# Patient Record
Sex: FEMALE | Race: WHITE
[De-identification: names, ages, dates, MRNs, and addresses within clinical notes are randomized per-mention and may not be internally consistent; named-entity substitution may affect disease eponyms.]

---

## 2018-07-20 ENCOUNTER — HOSPITAL ENCOUNTER (INPATIENT)
Dept: HOSPITAL 47 - EC | Age: 73
LOS: 4 days | Discharge: HOME | DRG: 247 | End: 2018-07-24
Payer: MEDICARE

## 2018-07-20 VITALS — BODY MASS INDEX: 39.9 KG/M2

## 2018-07-20 DIAGNOSIS — Z82.49: ICD-10-CM

## 2018-07-20 DIAGNOSIS — Z88.2: ICD-10-CM

## 2018-07-20 DIAGNOSIS — I21.4: Primary | ICD-10-CM

## 2018-07-20 DIAGNOSIS — E66.9: ICD-10-CM

## 2018-07-20 DIAGNOSIS — I08.1: ICD-10-CM

## 2018-07-20 DIAGNOSIS — E78.5: ICD-10-CM

## 2018-07-20 DIAGNOSIS — Z87.891: ICD-10-CM

## 2018-07-20 DIAGNOSIS — I25.110: ICD-10-CM

## 2018-07-20 DIAGNOSIS — M06.9: ICD-10-CM

## 2018-07-20 DIAGNOSIS — E11.9: ICD-10-CM

## 2018-07-20 DIAGNOSIS — Z79.82: ICD-10-CM

## 2018-07-20 DIAGNOSIS — Z83.3: ICD-10-CM

## 2018-07-20 DIAGNOSIS — Z79.84: ICD-10-CM

## 2018-07-20 DIAGNOSIS — I95.9: ICD-10-CM

## 2018-07-20 DIAGNOSIS — Z96.659: ICD-10-CM

## 2018-07-20 DIAGNOSIS — Z90.710: ICD-10-CM

## 2018-07-20 DIAGNOSIS — Z79.899: ICD-10-CM

## 2018-07-20 DIAGNOSIS — I10: ICD-10-CM

## 2018-07-20 DIAGNOSIS — I45.10: ICD-10-CM

## 2018-07-20 LAB
CK SERPL-CCNC: 66 U/L (ref 30–135)
CK SERPL-CCNC: 74 U/L (ref 30–135)
GLUCOSE BLD-MCNC: 152 MG/DL (ref 75–99)
TROPONIN I SERPL-MCNC: 0.03 NG/ML (ref 0–0.03)
TROPONIN I SERPL-MCNC: 0.04 NG/ML (ref 0–0.03)

## 2018-07-20 PROCEDURE — 96376 TX/PRO/DX INJ SAME DRUG ADON: CPT

## 2018-07-20 PROCEDURE — 93005 ELECTROCARDIOGRAM TRACING: CPT

## 2018-07-20 PROCEDURE — 80061 LIPID PANEL: CPT

## 2018-07-20 PROCEDURE — 85730 THROMBOPLASTIN TIME PARTIAL: CPT

## 2018-07-20 PROCEDURE — 36415 COLL VENOUS BLD VENIPUNCTURE: CPT

## 2018-07-20 PROCEDURE — 93306 TTE W/DOPPLER COMPLETE: CPT

## 2018-07-20 PROCEDURE — 85049 AUTOMATED PLATELET COUNT: CPT

## 2018-07-20 PROCEDURE — 82553 CREATINE MB FRACTION: CPT

## 2018-07-20 PROCEDURE — 99285 EMERGENCY DEPT VISIT HI MDM: CPT

## 2018-07-20 PROCEDURE — 85025 COMPLETE CBC W/AUTO DIFF WBC: CPT

## 2018-07-20 PROCEDURE — 80048 BASIC METABOLIC PNL TOTAL CA: CPT

## 2018-07-20 PROCEDURE — 83735 ASSAY OF MAGNESIUM: CPT

## 2018-07-20 PROCEDURE — 82550 ASSAY OF CK (CPK): CPT

## 2018-07-20 PROCEDURE — 96365 THER/PROPH/DIAG IV INF INIT: CPT

## 2018-07-20 PROCEDURE — 84484 ASSAY OF TROPONIN QUANT: CPT

## 2018-07-20 PROCEDURE — 93458 L HRT ARTERY/VENTRICLE ANGIO: CPT

## 2018-07-20 PROCEDURE — 96366 THER/PROPH/DIAG IV INF ADDON: CPT

## 2018-07-20 RX ADMIN — HEPARIN SODIUM PRN UNIT: 5000 INJECTION, SOLUTION INTRAVENOUS; SUBCUTANEOUS at 23:20

## 2018-07-20 RX ADMIN — HEPARIN SODIUM SCH MLS/HR: 5000 INJECTION, SOLUTION INTRAVENOUS at 17:12

## 2018-07-20 RX ADMIN — NITROGLYCERIN SCH: 20 OINTMENT TOPICAL at 23:15

## 2018-07-20 RX ADMIN — NITROGLYCERIN SCH INCH: 20 OINTMENT TOPICAL at 19:30

## 2018-07-20 RX ADMIN — SODIUM CHLORIDE, PRESERVATIVE FREE SCH: 5 INJECTION INTRAVENOUS at 22:01

## 2018-07-20 NOTE — ED
General Adult HPI





- General


Chief complaint: Chest Pain


Stated complaint: Chest pain


Time Seen by Provider: 07/20/18 15:54


Source: patient, EMS, RN notes reviewed, old records reviewed (Reviewed records 

from [facility] including x-ray report, ER report, EKGs and 

laboratory data.)


Mode of arrival: EMS


Limitations: no limitations





- History of Present Illness


Initial comments: 





Patient is a pleasant 73-year-old female presenting to the emergency Department 

with chest discomfort.  Patient has been having symptoms for close to a month.  

Symptoms are exertional.  Symptoms improved with rest and are mild at this 

time.  Discomfort was severe today while walking to [facility] for 

a mammogram.  Patient has minimal associated dyspnea.  No nausea.  Patient does 

have associated sweating.  No history of similar symptoms previously.  Case was 

discussed with transferring physician prior to transfer.  There is questionable 

EKG changes in lead aVF and 3.  Troponin was negative.  Patient denies any calf 

pain or leg swelling.





Review of Systems


ROS Statement: 


Those systems with pertinent positive or pertinent negative responses have been 

documented in the HPI.





ROS Other: All systems not noted in ROS Statement are negative.


Constitutional: Denies: fever


Eyes: Denies: eye pain


ENT: Denies: ear pain


Respiratory: Denies: cough


Cardiovascular: Reports: chest pain


Endocrine: Denies: fatigue


Gastrointestinal: Denies: abdominal pain


Genitourinary: Denies: dysuria


Musculoskeletal: Denies: back pain


Skin: Denies: rash


Neurological: Denies: weakness





Past Medical History


Past Medical History: Diabetes Mellitus, Hyperlipidemia, Hypertension, 

Rheumatoid Arthritis (RA)


Past Surgical History: Hysterectomy


Additional Past Surgical History / Comment(s): knee replacement


Past Psychological History: No Psychological Hx Reported


Smoking Status: Never smoker


Past Alcohol Use History: Rare


Past Drug Use History: None Reported





General Exam


Limitations: no limitations


General appearance: alert, in no apparent distress


Head exam: Present: atraumatic, normocephalic


Eye exam: Present: normal appearance, PERRL


ENT exam: Present: normal oropharynx


Neck exam: Present: normal inspection


Respiratory exam: Present: normal lung sounds bilaterally.  Absent: chest wall 

tenderness


Cardiovascular Exam: Present: regular rate, normal rhythm


  ** Expanded


Peripheral pulses: 2+: Radial (R), Radial (L), Posterior Tibialis (R), 

Posterior Tibialis (L)


GI/Abdominal exam: Present: soft.  Absent: tenderness


Extremities exam: Present: normal inspection.  Absent: pedal edema, calf 

tenderness


Neurological exam: Present: alert


Psychiatric exam: Present: normal affect, normal mood


Skin exam: Present: normal color





Course





 Vital Signs











  07/20/18





  15:58


 


Temperature 97.8 F


 


Pulse Rate 73


 


Respiratory 16





Rate 


 


Blood Pressure 162/74


 


O2 Sat by Pulse 97





Oximetry 














- Reevaluation(s)


Reevaluation #1: 





07/20/18 16:13


Case was discussed with Dr. pat, who will admit for Dr. Lehman.





EKG Findings





- EKG Comments:


EKG Findings:: Normal sinus rhythm 78.  .  .  .  .  

Left axis.  Incomplete right bundle-branch block.  LVH criteria.  No acute ST 

change.





Disposition


Clinical Impression: 


 Unstable angina pectoris





Disposition: ADMITTED AS IP TO THIS HOSP


Is patient prescribed a controlled substance at d/c from ED?: No


Referrals: 


Elidia Lehman MD [Primary Care Provider] - 1-2 days


Decision Time: 16:14

## 2018-07-21 LAB
CHOLEST SERPL-MCNC: 170 MG/DL (ref ?–200)
GLUCOSE BLD-MCNC: 100 MG/DL (ref 75–99)
GLUCOSE BLD-MCNC: 114 MG/DL (ref 75–99)
GLUCOSE BLD-MCNC: 118 MG/DL (ref 75–99)
GLUCOSE BLD-MCNC: 96 MG/DL (ref 75–99)
HDLC SERPL-MCNC: 79 MG/DL (ref 40–60)
LDLC SERPL CALC-MCNC: 75 MG/DL (ref 0–99)
PLATELET # BLD AUTO: 197 K/UL (ref 150–450)
TRIGL SERPL-MCNC: 78 MG/DL (ref ?–150)

## 2018-07-21 RX ADMIN — NITROGLYCERIN SCH: 20 OINTMENT TOPICAL at 05:13

## 2018-07-21 RX ADMIN — INSULIN ASPART SCH: 100 INJECTION, SOLUTION INTRAVENOUS; SUBCUTANEOUS at 21:01

## 2018-07-21 RX ADMIN — ASPIRIN 325 MG ORAL TABLET SCH MG: 325 PILL ORAL at 09:07

## 2018-07-21 RX ADMIN — METOPROLOL TARTRATE SCH MG: 25 TABLET, FILM COATED ORAL at 21:02

## 2018-07-21 RX ADMIN — SODIUM CHLORIDE, PRESERVATIVE FREE SCH ML: 5 INJECTION INTRAVENOUS at 21:02

## 2018-07-21 RX ADMIN — ASPIRIN 325 MG ORAL TABLET SCH: 325 PILL ORAL at 10:47

## 2018-07-21 RX ADMIN — NITROGLYCERIN SCH INCH: 20 OINTMENT TOPICAL at 17:13

## 2018-07-21 RX ADMIN — ATORVASTATIN CALCIUM SCH: 80 TABLET, FILM COATED ORAL at 10:48

## 2018-07-21 RX ADMIN — NITROGLYCERIN SCH INCH: 20 OINTMENT TOPICAL at 12:02

## 2018-07-21 RX ADMIN — HEPARIN SODIUM SCH MLS/HR: 5000 INJECTION, SOLUTION INTRAVENOUS at 14:39

## 2018-07-21 RX ADMIN — NITROGLYCERIN SCH INCH: 20 OINTMENT TOPICAL at 22:47

## 2018-07-21 RX ADMIN — SODIUM CHLORIDE, PRESERVATIVE FREE SCH ML: 5 INJECTION INTRAVENOUS at 09:08

## 2018-07-21 RX ADMIN — METOPROLOL TARTRATE SCH MG: 25 TABLET, FILM COATED ORAL at 11:58

## 2018-07-21 RX ADMIN — ATORVASTATIN CALCIUM SCH MG: 80 TABLET, FILM COATED ORAL at 09:26

## 2018-07-21 RX ADMIN — INSULIN ASPART SCH: 100 INJECTION, SOLUTION INTRAVENOUS; SUBCUTANEOUS at 17:01

## 2018-07-21 NOTE — P.HPIM
History of Present Illness


73-year-old the female came in with compensative chest chart chest pain which 

as of breath and diaphoresis denied any pleurisy not associated with food 

patient has minimally elevated troponins of 0.03 cardiology evaluated the 

patient patient has nonspecific ST-T wave changes they believe patient has non-

ST elevation microinfarction the recommending cardiac catheterization on 

Monday.  Patient is presently on IV heparin patient has sinus last and chest 

pain nonexertional.








Review of Systems


REVIEW OF SYSTEMS: 


CONSTITUTIONAL: No fever, no malaise, no fatigue. 


HEENT: No recent visual problems or hearing problems. Denied any sore throat. 


CARDIOVASCULAR: No  orthopnea, PND, no palpitations, no syncope. 


PULMONARY: No shortness of breath, no cough, no hemoptysis. 


GASTROINTESTINAL: No diarrhea, no nausea, no vomiting, no abdominal pain. 

Normoactive bowel sounds. 


NEUROLOGICAL: No headaches, no weakness, no numbness. 


HEMATOLOGICAL: Denies any bleeding or petechiae. 


GENITOURINARY: Denies any burning micturition, frequency, or urgency. 


MUSCULOSKELETAL/RHEUMATOLOGICAL: Denies any joint pain, swelling, or any muscle 

pain. 


ENDOCRINE: Denies any polyuria or polydipsia. 





The rest of the 14-point review of systems is negative.











Past Medical History


Past Medical History: Diabetes Mellitus, Hyperlipidemia, Hypertension, 

Rheumatoid Arthritis (RA)


History of Any Multi-Drug Resistant Organisms: None Reported


Past Surgical History: Hysterectomy


Additional Past Surgical History / Comment(s): knee replacement


Past Anesthesia/Blood Transfusion Reactions: No Reported Reaction


Smoking Status: Former smoker





- Past Family History


  ** Mother


Family Medical History: Diabetes Mellitus





  ** Brother(s)


Family Medical History: Diabetes Mellitus


Additional Family Medical History / Comment(s): atrial tachycardia





  ** Sister(s)


Additional Family Medical History / Comment(s): ventricular tachycardia





Medications and Allergies


 Home Medications











 Medication  Instructions  Recorded  Confirmed  Type


 


Aspirin EC [Ecotrin Low Dose] 81 mg PO HS 07/20/18 07/20/18 History


 


Cholecalciferol [Vitamin D3] 1,000 unit PO DAILY 07/20/18 07/20/18 History


 


Etanercept [Enbrel] 50 mg SQ KC 07/20/18 07/20/18 History


 


Leflunomide [Arava] 20 mg PO DAILY 07/20/18 07/20/18 History


 


Lisinopril [Zestril] 10 mg PO DAILY 07/20/18 07/20/18 History


 


Simvastatin [Zocor] 20 mg PO HS 07/20/18 07/20/18 History


 


metFORMIN HCL 1,000 mg PO BID 07/20/18 07/20/18 History











 Allergies











Allergy/AdvReac Type Severity Reaction Status Date / Time


 


Sulfa (Sulfonamide Allergy  Rash/Hives Verified 07/20/18 16:26





Antibiotics)     














Physical Exam


Vitals: 


 Vital Signs











  Temp Pulse Pulse Resp BP BP Pulse Ox


 


 07/21/18 11:55  97.6 F   79  16   127/69  96


 


 07/21/18 09:50        96


 


 07/21/18 09:00    82  16   


 


 07/21/18 04:00  97.8 F   82  16   104/57  95


 


 07/21/18 00:00  98.5 F   83  17   89/52  96


 


 07/20/18 19:27   88   18  139/71   96


 


 07/20/18 16:58  98.1 F  82   18  149/73   93 L


 


 07/20/18 15:58  97.8 F  73   16  162/74   97








 Intake and Output











 07/20/18 07/21/18 07/21/18





 22:59 06:59 14:59


 


Intake Total  582.631 601.268


 


Balance  582.631 601.268


 


Intake:   


 


  IV  460 100


 


    0.9  200 100


 


    Heparin Sod,Pork in 0.45%  260 





    NaCl 25,000 unit In 0.45   





    % NaCl 1 500ml.bag @ 8.9   





    UNITS/KG/HR 19.94 mls/hr   





    IV .Q24H CHARLY Rx#:   





    [id number]   


 


  Intake, IV Titration  122.631 261.268





  Amount   


 


    Heparin Sod,Pork in 0.45%  122.631 261.268





    NaCl 25,000 unit In 0.45   





    % NaCl 1 500ml.bag @ 8.9   





    UNITS/KG/HR 19.94 mls/hr   





    IV .Q24H CHARLY Rx#:   





    [id number]   


 


  Oral   240


 


Other:   


 


  Voiding Method  Toilet Toilet


 


  # Voids 1 1 1


 


  Weight 112.037 kg 111.9 kg 











PHYSICAL EXAMINATION: 





GENERAL: The patient is alert and oriented x3, not in any acute distress. Well 

developed, well nourished. 


HEENT: Pupils are round and equally reacting to light. EOMI. No scleral 

icterus. No conjunctival pallor. Normocephalic, atraumatic. No pharyngeal 

erythema. No thyromegaly. 


CARDIOVASCULAR: S1 and S2 present. No murmurs, rubs, or gallops. 


PULMONARY: Chest is clear to auscultation, no wheezing or crackles. 


ABDOMEN: Soft, nontender, nondistended, normoactive bowel sounds. No palpable 

organomegaly. 


MUSCULOSKELETAL: No joint swelling or deformity.


EXTREMITIES: No cyanosis, clubbing, or pedal edema. 


NEUROLOGICAL: Gross neurological examination did not reveal any focal deficits. 


SKIN: No rashes. 











Results


CBC & Chem 7: 


 07/21/18 06:27





Labs: 


 Abnormal Lab Results - Last 24 Hours (Table)











  07/20/18 07/20/18 07/20/18 Range/Units





  16:09 16:09 20:57 


 


APTT  18.9 L    (22.0-30.0)  sec


 


POC Glucose (mg/dL)    152 H  (75-99)  mg/dL


 


Troponin I   0.035 H*   (0.000-0.034)  ng/mL


 


HDL Cholesterol     (40-60)  mg/dL














  07/20/18 07/21/18 07/21/18 Range/Units





  22:20 06:06 06:27 


 


APTT  30.4 H   52.2 H  (22.0-30.0)  sec


 


POC Glucose (mg/dL)   118 H   (75-99)  mg/dL


 


Troponin I     (0.000-0.034)  ng/mL


 


HDL Cholesterol     (40-60)  mg/dL














  07/21/18 Range/Units





  06:27 


 


APTT   (22.0-30.0)  sec


 


POC Glucose (mg/dL)   (75-99)  mg/dL


 


Troponin I   (0.000-0.034)  ng/mL


 


HDL Cholesterol  79 H  (40-60)  mg/dL














Thrombosis Risk Factor Assmnt





- Choose All That Apply


Any of the Below Risk Factors Present?: Yes


Each Factor Represents 1 point: Obesity (BMI >25)


Other Risk Factors: Yes


Each Risk Factor Represents 2 Points: Age 61-74 years


Thrombosis Risk Factor Assessment Total Risk Factor Score: 3


Thrombosis Risk Factor Assessment Level: Moderate Risk





Assessment and Plan


Plan: 


-Chest discomfort possible non-ST elevation microinfarction, patient is on beta 

blocker patient is on aspirin IV heparin.  Patient will undergo cardiac 

catheterization on Monday


-Hypertension hold off on lisinopril with concerns of hypotension patient blood 

pressure is on the low normal side can continue with metoprolol


-Hyperlipidemia


-Type 2 diabetes mellitus for left metformin patient may sliding scale insulin


-Rheumatoid arthritis patient is on biologic agents which will be continued


-Wasting

## 2018-07-21 NOTE — P.PN
Progress Note - Text


This is an addendum to the dictated cardiology consultation.  The patient 

presents with symptoms of exertional chest discomfort, started about a months 

ago.  She is pain-free at this time and has no rest symptoms.  She has no prior 

documented history of CAD or any cardiac workup.  She had minimal elevation of 

the troponin on her initial sample, there was no acute EKG changes.


Her physical examination shows no evidence of fluid overload and she is in 

sinus mechanism.


The patient presents with new onset unstable angina with minimal elevation of 

the troponin.  I will obtain an echocardiogram with Doppler, add beta blocker 

and statin, proceed with cardiac catheterization to further assess her status 

and guide her treatment.  The risk and the complications and indications were 

discussed with the patient and her .  Thank you for this consult we will 

follow with you.

## 2018-07-21 NOTE — P.CRDCN
History of Present Illness


Consult date: 07/21/18


Requesting physician: Jm Yanez


Consult reason: chest pain


Chief complaint: Chest pain


History of present illness: 


This is a pleasant 73-year-old  female with history of hypertension, 

diabetes, hyperlipidemia, nonsmoker, who presents to the hospital with symptoms 

of chest discomfort.  According to the patient she states she's been getting 

symptoms off and on for approximately one month.  She describes as symptoms as 

a pressure and heaviness in her chest with associated sharp discomfort as well, 

she does get associated shortness of breath with diaphoresis.  These usually 

occur with exertion and subside with rest.  Yesterday the patient was scheduled 

to have her routine mammogram [facility], just walking into the 

hospital she developed moderate to severe chest discomfort.  Patient was seen 

in the emergency room at [facility], and subsequently transferred 

here.  Laboratory data at [facility], white blood cell count 4.9, 

hemoglobin 12.2, platelet count 203.  Sodium 140, potassium 4.0, BUN 26, 

creatinine 0.7.  Magnesium 1.4 alk phos AST ALT normal troponin 0.03.  Chest x-

ray performed there revealed stable findings without evidence of acute 

infiltrate or vascular compensation.  EKG performed at [facility] 

showed a normal sinus rhythm with nonspecific ST-T wave changes noted in the 

inferior leads, incomplete right bundle branch block pattern.  EKG performed on 

arrival here showed a normal sinus rhythm with ST-T wave changes in the 

inferior leads and incomplete right bundle branch block pattern.  Laboratory 

reviewed here, Troponins 0.035 0.030.  Cholesterol 170, triglycerides 78, LDL 75

, HDL 79.  Blood pressure 104/60 with a heart rate in the 80s, afebrile.  At 

the time of my examination this morning, patient is currently chest pain-free.








Past Medical History


Past Medical History: Diabetes Mellitus, Hyperlipidemia, Hypertension, 

Rheumatoid Arthritis (RA)


History of Any Multi-Drug Resistant Organisms: None Reported


Past Surgical History: Hysterectomy


Additional Past Surgical History / Comment(s): knee replacement


Past Anesthesia/Blood Transfusion Reactions: No Reported Reaction


Smoking Status: Former smoker





- Past Family History


  ** Mother


Family Medical History: Diabetes Mellitus





  ** Brother(s)


Family Medical History: Diabetes Mellitus


Additional Family Medical History / Comment(s): atrial tachycardia





  ** Sister(s)


Additional Family Medical History / Comment(s): ventricular tachycardia





Medications and Allergies


 Home Medications











 Medication  Instructions  Recorded  Confirmed  Type


 


Aspirin EC [Ecotrin Low Dose] 81 mg PO HS 07/20/18 07/20/18 History


 


Cholecalciferol [Vitamin D3] 1,000 unit PO DAILY 07/20/18 07/20/18 History


 


Etanercept [Enbrel] 50 mg SQ KC 07/20/18 07/20/18 History


 


Leflunomide [Arava] 20 mg PO DAILY 07/20/18 07/20/18 History


 


Lisinopril [Zestril] 10 mg PO DAILY 07/20/18 07/20/18 History


 


Simvastatin [Zocor] 20 mg PO HS 07/20/18 07/20/18 History


 


metFORMIN HCL 1,000 mg PO BID 07/20/18 07/20/18 History











 Allergies











Allergy/AdvReac Type Severity Reaction Status Date / Time


 


Sulfa (Sulfonamide Allergy  Rash/Hives Verified 07/20/18 16:26





Antibiotics)     














Physical Exam


Vitals: 


 Vital Signs











  Temp Pulse Pulse Resp BP BP Pulse Ox


 


 07/21/18 04:00  97.8 F   82  16   104/57  95


 


 07/21/18 00:00  98.5 F   83  17   89/52  96


 


 07/20/18 19:27   88   18  139/71   96


 


 07/20/18 16:58  98.1 F  82   18  149/73   93 L


 


 07/20/18 15:58  97.8 F  73   16  162/74   97








 Intake and Output











 07/20/18 07/21/18 07/21/18





 22:59 06:59 14:59


 


Intake Total  582.631 0


 


Balance  582.631 0


 


Intake:   


 


  IV  460 


 


    0.9  200 


 


    Heparin Sod,Pork in 0.45%  260 





    NaCl 25,000 unit In 0.45   





    % NaCl 1 500ml.bag @ 8.9   





    UNITS/KG/HR 19.94 mls/hr   





    IV .Q24H CHARLY Rx#:   





    [id number]   


 


  Intake, IV Titration  122.631 





  Amount   


 


    Heparin Sod,Pork in 0.45%  122.631 





    NaCl 25,000 unit In 0.45   





    % NaCl 1 500ml.bag @ 8.9   





    UNITS/KG/HR 19.94 mls/hr   





    IV .Q24H CHARLY Rx#:   





    [id number]   


 


  Oral   0


 


Other:   


 


  Voiding Method  Toilet 


 


  # Voids 1 1 


 


  Weight 112.037 kg 111.9 kg 














PHYSICAL EXAMINATION: 





GENERAL: Pleasant 73-year-old  female in no acute distress at the time 

of my examination





HEENT: Head is atraumatic, normocephalic.  Pupils equal, round.  Sclera 

anicteric. Conjunctiva are clear.  Mucous membranes of the mouth are moist.  

Neck is supple.  There is no elevated jugular venous pressure.  No carotid  

bruit is heard.





HEART EXAMINATION: Heart S1, S2 normal.  No murmur or gallop heard.





CHEST EXAMINATION: Lungs are clear to auscultation and precussion. No chest 

wall tenderness is noted on palpation or with deep breathing.





ABDOMEN:  Soft, obese, nontender. Bowel sounds are heard. No organomegaly noted.


 


EXTREMITIES: 2+ peripheral pulses with trace  evidence of peripheral edema and 

no calf tenderness noted.





NEUROLOGIC patient is awake, alert and oriented X3.


 


.


 








Results





 07/21/18 06:27





 Cardiac Enzymes











  07/20/18 07/20/18 Range/Units





  16:09 22:20 


 


CK-MB (CK-2)  1.9  2.2  (0.0-2.4)  ng/mL


 


Troponin I  0.035 H*  0.030  (0.000-0.034)  ng/mL








 Coagulation











  07/20/18 07/20/18 07/21/18 Range/Units





  16:09 22:20 06:27 


 


APTT  18.9 L  30.4 H  52.2 H  (22.0-30.0)  sec








 Lipids











  07/21/18 Range/Units





  06:27 


 


Triglycerides  78  (<150)  mg/dL


 


Cholesterol  170  (<200)  mg/dL


 


HDL Cholesterol  79 H  (40-60)  mg/dL








 CBC











  07/21/18 Range/Units





  06:27 


 


Plt Count  197  (150-450)  k/uL








 Current Medications











Generic Name Dose Route Start Last Admin





  Trade Name Freq  PRN Reason Stop Dose Admin


 


Aspirin  325 mg  07/21/18 09:00  





  Aspirin  PO   





  DAILY CHARLY   





     





     





     





     


 


Heparin Sodium (Porcine)  0 unit  07/20/18 16:15  07/20/18 23:20





  Heparin  IV   4,000 unit





  Q6HR PRN   Administration





  Low PTT   





     





  Protocol   





     


 


Heparin Sodium/Sodium Chloride  500 mls @ 19.94 mls/hr  07/20/18 16:15  07/20/ 18 23:21





  25,000 unit/ Sodium Chloride  IV   11.9 units/kg/hr





  .Q24H CHARLY   26.66 mls/hr





     Titration





     





  Protocol   





  8.9 UNITS/KG/HR   


 


Nitroglycerin  1 inch  07/20/18 18:00  07/21/18 05:13





  Nitro-Bid Oint  TOPICAL   Not Given





  Q6HR [facility]   





     





     





     





     


 


Nitroglycerin  0.4 mg  07/20/18 16:15  





  Nitrostat  SUBLINGUAL   





  Q5M PRN   





  Chest Pain   





     





     





     


 


Sodium Chloride  10 ml  07/20/18 21:00  07/20/18 22:01





  Saline Flush  IV   Not Given





  BID CHARLY   





     





     





     





     








 Intake and Output











 07/20/18 07/21/18 07/21/18





 22:59 06:59 14:59


 


Intake Total  582.631 0


 


Balance  582.631 0


 


Intake:   


 


  IV  460 


 


    0.9  200 


 


    Heparin Sod,Pork in 0.45%  260 





    NaCl 25,000 unit In 0.45   





    % NaCl 1 500ml.bag @ 8.9   





    UNITS/KG/HR 19.94 mls/hr   





    IV .Q24H CHARLY Rx#:   





    [id number]   


 


  Intake, IV Titration  122.631 





  Amount   


 


    Heparin Sod,Pork in 0.45%  122.631 





    NaCl 25,000 unit In 0.45   





    % NaCl 1 500ml.bag @ 8.9   





    UNITS/KG/HR 19.94 mls/hr   





    IV .Q24H CHARLY Rx#:   





    [id number]   


 


  Oral   0


 


Other:   


 


  Voiding Method  Toilet 


 


  # Voids 1 1 


 


  Weight 112.037 kg 111.9 kg 








 





 07/21/18 06:27 











EKG Interpretations (text)


EKG shows normal sinus rhythm with incomplete right bundle branch block pattern 

and nonspecific ST-T wave changes noted in the inferior leads








Assessment and Plan


Plan: 


Assessment and plan


#1 chest discomfort suggestive of possible acute coronary syndrome.  EKG shows 

normal sinus rhythm with incomplete right bundle branch block pattern and 

nonspecific ST-T wave changes in the inferior leads.  Troponins 0.03, 0.03, 

0.03.


#2 hypertension


#3 hyperlipidemia


#4 diabetes


#5 obesity


#6 rheumatoid arthritis








Plan


We will obtain a subsequent troponin, echocardiogram with Doppler study is also 

been requested.  We will continue with an aspirin, IV heparin, Nitropaste, we 

will also initiate the patient on a statin.  Pending the results of subsequent 

troponin and echo.  Further recommendations will be made.





DNP note has been reviewed, I agree with a documented findings and plan of 

care.  Patient was seen and examined.

## 2018-07-21 NOTE — ECHOF
Referral Reason:chest pain



MEASUREMENTS

--------

HEIGHT: 165.1 cm

WEIGHT: 111.6 kg

BP: 

RVIDd:   2.8 cm     (< 3.3)

IVSd:   1.2 cm     (0.6 - 1.1)

LVIDd:   4.2 cm     (3.9 - 5.3)

LVPWd:   1.4 cm     (0.6 - 1.1)

IVSs:   1.5 cm

LVIDs:   3.4 cm

LVPWs:   1.4 cm

LAESV Index (A-L):   30.27 ml/m

Ao Diam:   3.7 cm     (2.0 - 3.7)

AV Cusp:   2.0 cm     (1.5 - 2.6)

LA Diam:   3.0 cm     (2.7 - 3.8)

MV EXCURSION:   16.009 mm     (> 18.000)

MV EF SLOPE:   68 mm/s     (70 - 150)

EPSS:   0.8 cm

MV E Paulino:   0.65 m/s

MV DecT:   295 ms

MV A Paulino:   0.99 m/s

MV E/A Ratio:   0.66 

RAP:   5.00 mmHg

RVSP:   30.62 mmHg







FINDINGS

--------

Sinus rhythm.

This was a technically adequate study.

The left ventricular size is normal.   There is mild concentric left ventricular hypertrophy.   Overa
ll left ventricular systolic function is normal with, an EF between 55 - 60 %.

The right ventricle is normal in size.

LA is midly dilated 29-33ml/m2.

The right atrial size is normal.

The aortic valve is trileaflet, and appears structurally normal. No aortic stenosis or regurgitation.


Moderate mitral annular calcification present.   Mild mitral regurgitation is present.

Mild tricuspid regurgitation present.   There is no evidence of pulmonary hypertension.   The right v
entricular systolic pressure, as measured by Doppler, is 30.62mmHg.

The pulmonic valve was not well visualized.   There is no pulmonic regurgitation present.

The aortic root size is normal.

There is no pericardial effusion.



CONCLUSIONS

--------

1. Sinus rhythm.

2. The left ventricular size is normal.

3. There is mild concentric left ventricular hypertrophy.

4. Overall left ventricular systolic function is normal with, an EF between 55 - 60 %.

5. LA is midly dilated 29-33ml/m2.

6. The aortic valve is trileaflet, and appears structurally normal. No aortic stenosis or regurgitati
on.

7. Moderate mitral annular calcification present.

8. Mild mitral regurgitation is present.

9. Mild tricuspid regurgitation present.

10. There is no evidence of pulmonary hypertension.

11. The pulmonic valve was not well visualized.

12. There is no pulmonic regurgitation present.

13. The aortic root size is normal.

14. There is no pericardial effusion.





SONOGRAPHER: Malka Wilder RDCS

## 2018-07-22 LAB
ANION GAP SERPL CALC-SCNC: 5 MMOL/L
BUN SERPL-SCNC: 17 MG/DL (ref 7–17)
CALCIUM SPEC-MCNC: 9.3 MG/DL (ref 8.4–10.2)
CHLORIDE SERPL-SCNC: 115 MMOL/L (ref 98–107)
CO2 SERPL-SCNC: 21 MMOL/L (ref 22–30)
GLUCOSE BLD-MCNC: 101 MG/DL (ref 75–99)
GLUCOSE BLD-MCNC: 101 MG/DL (ref 75–99)
GLUCOSE BLD-MCNC: 95 MG/DL (ref 75–99)
GLUCOSE BLD-MCNC: 95 MG/DL (ref 75–99)
GLUCOSE SERPL-MCNC: 102 MG/DL (ref 74–99)
PLATELET # BLD AUTO: 196 K/UL (ref 150–450)
POTASSIUM SERPL-SCNC: 4.2 MMOL/L (ref 3.5–5.1)
SODIUM SERPL-SCNC: 141 MMOL/L (ref 137–145)

## 2018-07-22 RX ADMIN — SODIUM CHLORIDE, PRESERVATIVE FREE SCH ML: 5 INJECTION INTRAVENOUS at 22:10

## 2018-07-22 RX ADMIN — LEFLUNOMIDE SCH MG: 20 TABLET ORAL at 07:41

## 2018-07-22 RX ADMIN — ASPIRIN 325 MG ORAL TABLET SCH MG: 325 PILL ORAL at 07:41

## 2018-07-22 RX ADMIN — INSULIN ASPART SCH: 100 INJECTION, SOLUTION INTRAVENOUS; SUBCUTANEOUS at 06:10

## 2018-07-22 RX ADMIN — SODIUM CHLORIDE, PRESERVATIVE FREE SCH ML: 5 INJECTION INTRAVENOUS at 07:42

## 2018-07-22 RX ADMIN — INSULIN ASPART SCH: 100 INJECTION, SOLUTION INTRAVENOUS; SUBCUTANEOUS at 12:01

## 2018-07-22 RX ADMIN — NITROGLYCERIN SCH INCH: 20 OINTMENT TOPICAL at 11:01

## 2018-07-22 RX ADMIN — INSULIN ASPART SCH: 100 INJECTION, SOLUTION INTRAVENOUS; SUBCUTANEOUS at 16:51

## 2018-07-22 RX ADMIN — HEPARIN SODIUM PRN UNIT: 5000 INJECTION, SOLUTION INTRAVENOUS; SUBCUTANEOUS at 07:44

## 2018-07-22 RX ADMIN — NITROGLYCERIN SCH INCH: 20 OINTMENT TOPICAL at 06:11

## 2018-07-22 RX ADMIN — METOPROLOL TARTRATE SCH MG: 25 TABLET, FILM COATED ORAL at 07:41

## 2018-07-22 RX ADMIN — HEPARIN SODIUM SCH MLS/HR: 5000 INJECTION, SOLUTION INTRAVENOUS at 09:03

## 2018-07-22 RX ADMIN — INSULIN ASPART SCH: 100 INJECTION, SOLUTION INTRAVENOUS; SUBCUTANEOUS at 21:41

## 2018-07-22 RX ADMIN — NITROGLYCERIN SCH INCH: 20 OINTMENT TOPICAL at 17:18

## 2018-07-22 RX ADMIN — ATORVASTATIN CALCIUM SCH MG: 80 TABLET, FILM COATED ORAL at 07:41

## 2018-07-22 RX ADMIN — METOPROLOL TARTRATE SCH MG: 25 TABLET, FILM COATED ORAL at 20:09

## 2018-07-22 NOTE — P.PN
Subjective





Patient is admitted for non-ST elevation myocardial infarction.





No overnight events.





Constitutional: Denied any fatigue denied any fever.


Cardio vascular: denied any chest pain, palpitations


Gastrointestinal denied any nausea vomiting


Pulmonary: Denied any shortness of breath cough


Neurologic denied any new focal deficits





Objective





- Vital Signs


Vital signs: 


 Vital Signs











Temp  98.8 F   07/22/18 11:01


 


Pulse  72   07/22/18 11:01


 


Resp  16   07/22/18 11:01


 


BP  139/76   07/22/18 11:01


 


Pulse Ox  95   07/22/18 11:01








 Intake & Output











 07/21/18 07/22/18 07/22/18





 18:59 06:59 18:59


 


Intake Total 717..000


 


Balance 717..000


 


Weight  112.7 kg 


 


Intake:   


 


   257 40


 


    0.9 100 70 40


 


    Heparin Sod,Pork in 0.45%  187 





    NaCl 25,000 unit In 0.45   





    % NaCl 1 500ml.bag @ 8.9   





    UNITS/KG/HR 19.94 mls/hr   





    IV .Q24H [facility] Rx#:   





    [id number]   


 


  Intake, IV Titration 377.369  500.000





  Amount   


 


    Heparin Sod,Pork in 0.45% 377.369  500.000





    NaCl 25,000 unit In 0.45   





    % NaCl 1 500ml.bag @ 8.9   





    UNITS/KG/HR 19.94 mls/hr   





    IV .Q24H [facility] Rx#:   





    [id number]   


 


  Oral 240 340 880


 


Other:   


 


  Voiding Method Toilet Toilet Toilet


 


  # Voids 1 1 1














- Exam


PHYSICAL EXAMINATION: 





GENERAL: The patient is alert and oriented x3, not in any acute distress. Well 

developed, well nourished. 


HEENT: Pupils are round and equally reacting to light. EOMI. No scleral 

icterus. No conjunctival pallor. Normocephalic, atraumatic. No pharyngeal 

erythema. No thyromegaly. 


CARDIOVASCULAR: S1 and S2 present. No murmurs, rubs, or gallops. 


PULMONARY: Chest is clear to auscultation, no wheezing or crackles. 


ABDOMEN: Soft, nontender, nondistended, normoactive bowel sounds. No palpable 

organomegaly. 


MUSCULOSKELETAL: No joint swelling or deformity.


EXTREMITIES: No cyanosis, clubbing, or pedal edema. 


NEUROLOGICAL: Gross neurological examination did not reveal any focal deficits. 


SKIN: No rashes. 











- Labs


CBC & Chem 7: 


 07/22/18 06:29





 07/22/18 06:29


Labs: 


 Abnormal Lab Results - Last 24 Hours (Table)











  07/21/18 07/21/18 07/22/18 Range/Units





  16:56 20:48 06:02 


 


APTT     (22.0-30.0)  sec


 


Chloride     ()  mmol/L


 


Carbon Dioxide     (22-30)  mmol/L


 


Glucose     (74-99)  mg/dL


 


POC Glucose (mg/dL)  100 H  114 H  101 H  (75-99)  mg/dL














  07/22/18 07/22/18 07/22/18 Range/Units





  06:29 06:29 11:53 


 


APTT   39.6 H   (22.0-30.0)  sec


 


Chloride  115 H    ()  mmol/L


 


Carbon Dioxide  21 L    (22-30)  mmol/L


 


Glucose  102 H    (74-99)  mg/dL


 


POC Glucose (mg/dL)    101 H  (75-99)  mg/dL














Assessment and Plan


Plan: 


-Chest discomfort possible non-ST elevation microinfarction, patient is on beta 

blocker patient is on aspirin IV heparin.  Patient will undergo cardiac 

catheterization on Monday


-Hypertension hold off on lisinopril with concerns of hypotension patient blood 

pressure is on the low normal side can continue with metoprolol


-Hyperlipidemia


-Type 2 diabetes mellitus for left metformin patient may sliding scale insulin


-Rheumatoid arthritis patient is on biologic agents which will be continued

## 2018-07-22 NOTE — PN
PROGRESS NOTE



Mrs. Harper is a 73-year-old female with a history of hyperlipidemia and diabetes and

hypertension, who presented with symptoms of unstable angina.  She had mild discomfort

today walking to the bathroom.  Otherwise, she is feeling well.  She has no dizziness.

No palpitation.  No syncope.  She continues to be on aspirin once a day, Lipitor 80 mg

daily, IV heparin, metoprolol tartrate 25 mg twice a day, nitro paste.



PHYSICAL EXAMINATION:

Blood pressure 139/70 with a heart rate in 70s.

LUNGS:  Clear.

HEART:  Regular rate and rhythm S1, S2.  No S3.  No rub.

ABDOMEN:  Soft nontender.

EXTREMITIES:  No edema.



LAB DATA:

BUN and creatinine 17 and 0.72, potassium 4.2.



IMPRESSION:

1. Unstable angina.

2. Hypertension.

3. Hyperlipidemia.

4. Diabetes mellitus.



RECOMMENDATION:

Patient echocardiogram revealed a preserved left ventricular size and systolic function

with mild mitral and tricuspid regurgitation.  She will undergo a cardiac

catheterization tomorrow and depending on results of testing, further recommendations

will be made.





MMPORSHAL / HOMARN: [id number] / Job#: [id number]

## 2018-07-23 LAB
ANION GAP SERPL CALC-SCNC: 4 MMOL/L
BASOPHILS # BLD AUTO: 0 K/UL (ref 0–0.2)
BASOPHILS NFR BLD AUTO: 1 %
BUN SERPL-SCNC: 16 MG/DL (ref 7–17)
CALCIUM SPEC-MCNC: 9.1 MG/DL (ref 8.4–10.2)
CHLORIDE SERPL-SCNC: 113 MMOL/L (ref 98–107)
CO2 SERPL-SCNC: 25 MMOL/L (ref 22–30)
EOSINOPHIL # BLD AUTO: 0.2 K/UL (ref 0–0.7)
EOSINOPHIL NFR BLD AUTO: 5 %
ERYTHROCYTE [DISTWIDTH] IN BLOOD BY AUTOMATED COUNT: 3.84 M/UL (ref 3.8–5.4)
ERYTHROCYTE [DISTWIDTH] IN BLOOD: 13.8 % (ref 11.5–15.5)
GLUCOSE BLD-MCNC: 100 MG/DL (ref 75–99)
GLUCOSE BLD-MCNC: 107 MG/DL (ref 75–99)
GLUCOSE BLD-MCNC: 119 MG/DL (ref 75–99)
GLUCOSE BLD-MCNC: 124 MG/DL (ref 75–99)
GLUCOSE SERPL-MCNC: 103 MG/DL (ref 74–99)
HCT VFR BLD AUTO: 33.6 % (ref 34–46)
HGB BLD-MCNC: 10.4 GM/DL (ref 11.4–16)
LYMPHOCYTES # SPEC AUTO: 1.3 K/UL (ref 1–4.8)
LYMPHOCYTES NFR SPEC AUTO: 30 %
MCH RBC QN AUTO: 27 PG (ref 25–35)
MCHC RBC AUTO-ENTMCNC: 30.8 G/DL (ref 31–37)
MCV RBC AUTO: 87.6 FL (ref 80–100)
MONOCYTES # BLD AUTO: 0.4 K/UL (ref 0–1)
MONOCYTES NFR BLD AUTO: 9 %
NEUTROPHILS # BLD AUTO: 2.2 K/UL (ref 1.3–7.7)
NEUTROPHILS NFR BLD AUTO: 53 %
PLATELET # BLD AUTO: 184 K/UL (ref 150–450)
POTASSIUM SERPL-SCNC: 3.8 MMOL/L (ref 3.5–5.1)
SODIUM SERPL-SCNC: 142 MMOL/L (ref 137–145)
WBC # BLD AUTO: 4.2 K/UL (ref 3.8–10.6)

## 2018-07-23 PROCEDURE — B2151ZZ FLUOROSCOPY OF LEFT HEART USING LOW OSMOLAR CONTRAST: ICD-10-PCS

## 2018-07-23 PROCEDURE — 027034Z DILATION OF CORONARY ARTERY, ONE ARTERY WITH DRUG-ELUTING INTRALUMINAL DEVICE, PERCUTANEOUS APPROACH: ICD-10-PCS

## 2018-07-23 PROCEDURE — B2111ZZ FLUOROSCOPY OF MULTIPLE CORONARY ARTERIES USING LOW OSMOLAR CONTRAST: ICD-10-PCS

## 2018-07-23 PROCEDURE — 4A023N7 MEASUREMENT OF CARDIAC SAMPLING AND PRESSURE, LEFT HEART, PERCUTANEOUS APPROACH: ICD-10-PCS

## 2018-07-23 RX ADMIN — SODIUM CHLORIDE, PRESERVATIVE FREE SCH ML: 5 INJECTION INTRAVENOUS at 19:31

## 2018-07-23 RX ADMIN — HEPARIN SODIUM SCH MLS/HR: 5000 INJECTION, SOLUTION INTRAVENOUS at 01:24

## 2018-07-23 RX ADMIN — METOPROLOL TARTRATE SCH MG: 25 TABLET, FILM COATED ORAL at 06:57

## 2018-07-23 RX ADMIN — SODIUM CHLORIDE, PRESERVATIVE FREE SCH: 5 INJECTION INTRAVENOUS at 09:22

## 2018-07-23 RX ADMIN — INSULIN ASPART SCH: 100 INJECTION, SOLUTION INTRAVENOUS; SUBCUTANEOUS at 17:33

## 2018-07-23 RX ADMIN — INSULIN ASPART SCH: 100 INJECTION, SOLUTION INTRAVENOUS; SUBCUTANEOUS at 06:59

## 2018-07-23 RX ADMIN — INSULIN ASPART SCH: 100 INJECTION, SOLUTION INTRAVENOUS; SUBCUTANEOUS at 12:27

## 2018-07-23 RX ADMIN — METOPROLOL TARTRATE SCH MG: 25 TABLET, FILM COATED ORAL at 19:28

## 2018-07-23 RX ADMIN — LEFLUNOMIDE SCH MG: 20 TABLET ORAL at 06:56

## 2018-07-23 RX ADMIN — NITROGLYCERIN SCH INCH: 20 OINTMENT TOPICAL at 00:00

## 2018-07-23 RX ADMIN — LISINOPRIL SCH MG: 5 TABLET ORAL at 19:31

## 2018-07-23 RX ADMIN — INSULIN ASPART SCH: 100 INJECTION, SOLUTION INTRAVENOUS; SUBCUTANEOUS at 21:37

## 2018-07-23 RX ADMIN — NITROGLYCERIN SCH INCH: 20 OINTMENT TOPICAL at 06:56

## 2018-07-23 NOTE — CC
CARDIAC CATHETERIZATION REPORT



Mrs. Harper is a 73-year-old female with no prior documented history of coronary

artery disease who presented with symptoms of progressive exertional chest discomfort.

She had minimal elevation of her troponin.  Because of her history and her

presentation, recommendation was made regarding cardiac catheterization.  The procedure

as well as risks and the complications were discussed with the patient who is in full

understanding and agreement.



PROCEDURE:

Patient was brought to the cath lab in a fasting semi-sedated state after receiving

fentanyl and Benadryl and achieving moderate conscious sedated state.  Using Xylocaine

anesthesia in the Seldinger technique, a 6-[place] sheath was introduced in the right

radial artery.  Selective right and left coronary angiography performed using 5-[place]

3.5 bend right and left Daisy catheter. Multiple views of the coronary artery

including hemiaxial views were obtained. Following that, angioplasty and stenting was

performed.  Following that, 5-[place] tight pigtail catheter was introduced in the left

ventricle and a 30-degree FERMIN view of the left ventricle was obtained. Following that,

the catheter and sheaths were removed.  Hemostasis was obtained with deployment of a TR

band.  There was no immediate complication. Patient is returned to her room in stable

condition.



FINDINGS:

FLUOROSCOPY: There was calcification involving the right coronary artery and the mitral

annulus.



LEFT MAIN: This is a large-sized vessel bifurcating left circumflex, left anterior

descending artery. Left main coronary artery has no evidence of obstructive coronary

disease.

LEFT ANTERIOR DESCENDING ARTERY:  This is a large-sized vessel giving rise to a large

diagonal branch proximally.  Prior to the diagonal branch takeoff, there is a long

tubular lesion of 95% with slow flow in the LAD.



LEFT CIRCUMFLEX: This is a nondominant vessel giving rise to a very proximal obtuse

marginal branch.  The second obtuse marginal branch, the largest. The left circumflex

and its branches have no evidence of obstructive coronary artery disease.



RIGHT CORONARY ARTERY:  This is a large dominant vessel bifurcating distally PDA and

posterolateral segment and branches. The right coronary artery mid segment has a 50% to

60% stenosis.  The rest of the vessel has no high-grade stenosis.



LEFT VENTRICULOGRAM: Left ventriculogram is performed 30-degree FERMIN view and revealed a

normal left ventricular size and systolic function.  There was arrhythmia induced

mitral regurgitation.



HEMODYNAMICS:  There was no gradient across the aortic valve. The left ventricle end-

diastolic pressure was 20 to 24 mmHg.



CONCLUSION:

1. Calcified coronary arteries and calcified mitral annulus.

2. Severe stenosis in the proximal left anterior descending artery.

3. Moderate disease in the mid right coronary artery.



RECOMMENDATION:

In view of finding anatomy, I recommend proceeding with angioplasty and stenting of the

LAD. The procedure, risks and complications were discussed with the patient who is in

full understanding and agreement.





MMSHAYY / HOMARN: [id number] / Job#: [id number]

## 2018-07-23 NOTE — P.PN
Subjective





Patient is admitted for non-ST elevation myocardial infarction.  Patient 

underwent stenting of LAD.  Clinically doing well.





No overnight events.





Constitutional: Denied any fatigue denied any fever.


Cardio vascular: denied any chest pain, palpitations


Gastrointestinal denied any nausea vomiting


Pulmonary: Denied any shortness of breath cough


Neurologic denied any new focal deficits





Objective





- Vital Signs


Vital signs: 


 Vital Signs











Temp  97.2 F L  07/23/18 09:15


 


Pulse  68   07/23/18 09:23


 


Resp  17   07/23/18 06:00


 


BP  124/71   07/23/18 09:23


 


Pulse Ox  94 L  07/23/18 09:15








 Intake & Output











 07/22/18 07/23/18 07/23/18





 18:59 06:59 18:59


 


Intake Total 1420.000 950 356.4


 


Output Total 1000  


 


Balance 420.000 950 356.4


 


Weight  112.2 kg 112.2 kg


 


Intake:   


 


  IV 40 220 134.4


 


    0.9 40 220 


 


  Intake, IV Titration 500.000 500 





  Amount   


 


    Heparin Sod,Pork in 0.45% 500.000 500 





    NaCl 25,000 unit In 0.45   





    % NaCl 1 500ml.bag @ 8.9   





    UNITS/KG/HR 19.94 mls/hr   





    IV .Q24H [facility] Rx#:   





    [id number]   


 


  Oral 880 230 222


 


Output:   


 


  Urine 1000  


 


Other:   


 


  Voiding Method Toilet Toilet 


 


  # Voids 1 2 1














- Exam


PHYSICAL EXAMINATION: 





GENERAL: The patient is alert and oriented x3, not in any acute distress. Well 

developed, well nourished. 


HEENT: Pupils are round and equally reacting to light. EOMI. No scleral 

icterus. No conjunctival pallor. Normocephalic, atraumatic. No pharyngeal 

erythema. No thyromegaly. 


CARDIOVASCULAR: S1 and S2 present. No murmurs, rubs, or gallops. 


PULMONARY: Chest is clear to auscultation, no wheezing or crackles. 


ABDOMEN: Soft, nontender, nondistended, normoactive bowel sounds. No palpable 

organomegaly. 


MUSCULOSKELETAL: No joint swelling or deformity.


EXTREMITIES: No cyanosis, clubbing, or pedal edema. 


NEUROLOGICAL: Gross neurological examination did not reveal any focal deficits. 


SKIN: No rashes. 











- Labs


CBC & Chem 7: 


 07/23/18 05:47





 07/23/18 05:47


Labs: 


 Abnormal Lab Results - Last 24 Hours (Table)











  07/22/18 07/23/18 07/23/18 Range/Units





  13:53 05:47 05:47 


 


Hgb   10.4 L   (11.4-16.0)  gm/dL


 


Hct   33.6 L   (34.0-46.0)  %


 


MCHC   30.8 L   (31.0-37.0)  g/dL


 


APTT  62.5 H   64.5 H  (22.0-30.0)  sec


 


Chloride     ()  mmol/L


 


Glucose     (74-99)  mg/dL


 


POC Glucose (mg/dL)     (75-99)  mg/dL














  07/23/18 07/23/18 07/23/18 Range/Units





  05:47 06:13 11:55 


 


Hgb     (11.4-16.0)  gm/dL


 


Hct     (34.0-46.0)  %


 


MCHC     (31.0-37.0)  g/dL


 


APTT     (22.0-30.0)  sec


 


Chloride  113 H    ()  mmol/L


 


Glucose  103 H    (74-99)  mg/dL


 


POC Glucose (mg/dL)   107 H  119 H  (75-99)  mg/dL














Assessment and Plan


Plan: 


-Chest discomfort possible non-ST elevation myocardial infarction, status post 

cardiac catheterization and stenting of LAD, patient is on beta blocker dual 

antiplatelet therapy, statin and lisinopril.


-Hypertension continue lisinopril beta blocker.


-Hyperlipidemia


-Type 2 diabetes mellitus for left metformin patient is on sliding scale insulin


-Rheumatoid arthritis patient is on biologic agents which will be continued

## 2018-07-23 NOTE — PTCA
PERCUTANEOUSTRANS CORORONARY ANGIOGRAPHY



Mrs. Harper is 73-year-old female who presented with symptoms of unstable angina and

minimal elevation of troponin, underwent cardiac catheterization, was found to have

critical stenosis involving the proximal LAD. Recommendation was made regarding

angioplasty and stenting.  The procedure as well as the risks and the complications

were discussed with the patient who is in full understanding and agreement.



PROCEDURE:

A 6-[place] EBU 3.75 guiding catheter introduced in the system. After cannulating the

left main, a 0.014 balanced medium weight J-wire was advanced across the lesion

positioned distally then a 3.5 x 23 mm Xience Alpine stent was deployed post dilated at

16 atmospheres. Following that, the balloon was removed and a 4.0 x 50 mm [place] Eup[place]

balloon was advanced and 2 inflations at 12 atmospheres was done. After the last

inflation, after appropriate wait, the balloon and the guidewire were withdrawn back in

the guiding catheter.  Images were obtained,  repeated. Those images reveal stable

successful stenting.  At that point, the guiding catheter, the balloon and the

guidewire were removed and left ventriculogram was performed.  Following that, catheter

and sheaths were removed.  Hemostasis was obtained with deployment of a TR band.  There

was no immediate complication.  Patient was returned to her room in stable condition.

Of note, the patient received Angiomax per protocol as well as oral loading dose of

Effient.  She had chest discomfort and mild EKG changes with inflation that resolved at

the end of the procedure.



RESULTS:

Successful stenting of the proximal left anterior descending artery with reduction in

stenosis from 95% to 0%.



RECOMMENDATION:

The patient will be continued on aspirin, Effient, beta blocker, and statin.  The

importance of dual antiplatelet treatment were discussed with the patient and her

family and are in full understanding and agreement.



Duration procedure is 43 minutes.





MMODL / IJN: [id number] / Job#: [id number]

## 2018-07-24 VITALS
DIASTOLIC BLOOD PRESSURE: 73 MMHG | RESPIRATION RATE: 18 BRPM | TEMPERATURE: 97 F | HEART RATE: 80 BPM | SYSTOLIC BLOOD PRESSURE: 144 MMHG

## 2018-07-24 LAB
ANION GAP SERPL CALC-SCNC: 5 MMOL/L
ANION GAP SERPL CALC-SCNC: 5 MMOL/L
BASOPHILS # BLD AUTO: 0 K/UL (ref 0–0.2)
BASOPHILS NFR BLD AUTO: 1 %
BUN SERPL-SCNC: 16 MG/DL (ref 7–17)
BUN SERPL-SCNC: 18 MG/DL (ref 7–17)
CALCIUM SPEC-MCNC: 8.9 MG/DL (ref 8.4–10.2)
CALCIUM SPEC-MCNC: 9.3 MG/DL (ref 8.4–10.2)
CHLORIDE SERPL-SCNC: 112 MMOL/L (ref 98–107)
CHLORIDE SERPL-SCNC: 113 MMOL/L (ref 98–107)
CO2 SERPL-SCNC: 23 MMOL/L (ref 22–30)
CO2 SERPL-SCNC: 25 MMOL/L (ref 22–30)
EOSINOPHIL # BLD AUTO: 0.2 K/UL (ref 0–0.7)
EOSINOPHIL NFR BLD AUTO: 5 %
ERYTHROCYTE [DISTWIDTH] IN BLOOD BY AUTOMATED COUNT: 3.73 M/UL (ref 3.8–5.4)
ERYTHROCYTE [DISTWIDTH] IN BLOOD: 13.9 % (ref 11.5–15.5)
GLUCOSE BLD-MCNC: 103 MG/DL (ref 75–99)
GLUCOSE BLD-MCNC: 97 MG/DL (ref 75–99)
GLUCOSE SERPL-MCNC: 91 MG/DL (ref 74–99)
GLUCOSE SERPL-MCNC: 95 MG/DL (ref 74–99)
HCT VFR BLD AUTO: 32.6 % (ref 34–46)
HGB BLD-MCNC: 10.3 GM/DL (ref 11.4–16)
LYMPHOCYTES # SPEC AUTO: 1.4 K/UL (ref 1–4.8)
LYMPHOCYTES NFR SPEC AUTO: 28 %
MAGNESIUM SPEC-SCNC: 1.9 MG/DL (ref 1.6–2.3)
MCH RBC QN AUTO: 27.6 PG (ref 25–35)
MCHC RBC AUTO-ENTMCNC: 31.6 G/DL (ref 31–37)
MCV RBC AUTO: 87.4 FL (ref 80–100)
MONOCYTES # BLD AUTO: 0.4 K/UL (ref 0–1)
MONOCYTES NFR BLD AUTO: 7 %
NEUTROPHILS # BLD AUTO: 2.8 K/UL (ref 1.3–7.7)
NEUTROPHILS NFR BLD AUTO: 57 %
PLATELET # BLD AUTO: 177 K/UL (ref 150–450)
POTASSIUM SERPL-SCNC: 3.9 MMOL/L (ref 3.5–5.1)
POTASSIUM SERPL-SCNC: 3.9 MMOL/L (ref 3.5–5.1)
SODIUM SERPL-SCNC: 141 MMOL/L (ref 137–145)
SODIUM SERPL-SCNC: 142 MMOL/L (ref 137–145)
WBC # BLD AUTO: 4.9 K/UL (ref 3.8–10.6)

## 2018-07-24 RX ADMIN — METOPROLOL TARTRATE SCH MG: 25 TABLET, FILM COATED ORAL at 08:46

## 2018-07-24 RX ADMIN — LISINOPRIL SCH MG: 5 TABLET ORAL at 08:46

## 2018-07-24 RX ADMIN — LEFLUNOMIDE SCH MG: 20 TABLET ORAL at 08:45

## 2018-07-24 RX ADMIN — ATORVASTATIN CALCIUM SCH MG: 80 TABLET, FILM COATED ORAL at 08:45

## 2018-07-24 RX ADMIN — INSULIN ASPART SCH: 100 INJECTION, SOLUTION INTRAVENOUS; SUBCUTANEOUS at 06:00

## 2018-07-24 NOTE — P.DS
Providers


Date of admission: 


07/21/18 13:02





Attending physician: 


Jm Yanez MD





Consults: 





 





07/20/18 16:15


Consult Physician Urgent 


   Consulting Provider: Modesto Guadarrama


   Consult Reason/Comments: ua


   Do you want consulting provider notified?: Yes





07/23/18 09:08


Consult Physician Routine 


   Consulting Provider: [facility]


   Consult Reason/Comments: Post Interventional patient


   Do you want consulting provider notified?: Already Contacted











Primary care physician: 


Elidia Lemhan





[facility] Course: 





Patient is admitted for non-ST elevation myocardial infarction.  Patient 

underwent stenting of LAD.  Clinically doing well.  Had normal ejection fraction


PHYSICAL EXAMINATION: 





GENERAL: The patient is alert and oriented x3, not in any acute distress. Well 

developed, well nourished. 


HEENT: Pupils are round and equally reacting to light. EOMI. No scleral 

icterus. No conjunctival pallor. Normocephalic, atraumatic. No pharyngeal 

erythema. No thyromegaly. 


CARDIOVASCULAR: S1 and S2 present. No murmurs, rubs, or gallops. 


PULMONARY: Chest is clear to auscultation, no wheezing or crackles. 


ABDOMEN: Soft, nontender, nondistended, normoactive bowel sounds. No palpable 

organomegaly. 


MUSCULOSKELETAL: No joint swelling or deformity.


EXTREMITIES: No cyanosis, clubbing, or pedal edema. 


NEUROLOGICAL: Gross neurological examination did not reveal any focal deficits. 


SKIN: No rashes. 





Assessment and Plan


Plan: 


-Chest discomfort possible non-ST elevation myocardial infarction, status post 

cardiac catheterization and stenting of LAD, patient is on beta blocker dual 

antiplatelet therapy, statin and lisinopril.


-Hypertension continue lisinopril beta blocker.


-Hyperlipidemia


-Type 2 diabetes mellitus patient will be resumed on metformin


-Rheumatoid arthritis patient is on biologic agents which will be continued














Plan - Discharge Summary


New Discharge Prescriptions: 


New


   Atorvastatin [Lipitor] 80 mg PO DAILY #80 tab


   Metoprolol Tartrate [Lopressor] 25 mg PO BID #180 tab


   Nitroglycerin Sl Tabs [Nitrostat] 0.4 mg SUBLINGUAL Q5M PRN #25 tab


     PRN Reason: Chest Pain


   Prasugrel [Effient] 10 mg PO DAILY #90 tab





Continue


   metFORMIN HCL 1,000 mg PO BID


   Leflunomide [Arava] 20 mg PO DAILY


   Cholecalciferol [Vitamin D3] 1,000 unit PO DAILY


   Aspirin EC [Ecotrin Low Dose] 81 mg PO HS


   Lisinopril [Zestril] 10 mg PO DAILY #90 tab





Discontinued


   Simvastatin [Zocor] 20 mg PO HS





No Action


   Etanercept [Enbrel] 50 mg SQ KC


Discharge Medication List





Aspirin EC [Ecotrin Low Dose] 81 mg PO HS 07/20/18 [History]


Cholecalciferol [Vitamin D3] 1,000 unit PO DAILY 07/20/18 [History]


Etanercept [Enbrel] 50 mg SQ KC 07/20/18 [History]


Leflunomide [Arava] 20 mg PO DAILY 07/20/18 [History]


metFORMIN HCL 1,000 mg PO BID 07/20/18 [History]


Atorvastatin [Lipitor] 80 mg PO DAILY #80 tab 07/24/18 [Rx]


Lisinopril [Zestril] 10 mg PO DAILY #90 tab 07/24/18 [Rx]


Metoprolol Tartrate [Lopressor] 25 mg PO BID #180 tab 07/24/18 [Rx]


Nitroglycerin Sl Tabs [Nitrostat] 0.4 mg SUBLINGUAL Q5M PRN #25 tab 07/24/18 [Rx

]


Prasugrel [Effient] 10 mg PO DAILY #90 tab 07/24/18 [Rx]








Follow up Appointment(s)/Referral(s): 


Alhaji Moreno MD [STAFF PHYSICIAN] - 08/01/18 3:45 pm


Elidia Lehman MD [Primary Care Provider] - 07/30/18 11:00 am


Patient Instructions/Handouts:  Heart Healthy Diet (DC), Coronary Intravascular 

Stent Placement (DC), After Radial Heart Catheterization (GEN)


Activity/Diet/Wound Care/Special Instructions: 


Resume metformin in 48 hours.


Discharge Disposition: HOME SELF-CARE

## 2018-07-24 NOTE — PN
PROGRESS NOTE



Mrs. Harper is a 73-year-old female with known history of hypertension,

hyperlipidemia, diabetes mellitus, who presented with unstable angina, non ST-segment

elevation myocardial infarction.  She underwent cardiac catheterization and stenting of

her proximal LAD.  She is doing well this morning.  Her breathing is stable.  She is

ambulating without difficulty.  She denies any dizziness or palpitation.  She continued

to be on aspirin once a day, Effient 10 mg daily, lisinopril 10 mg daily, metoprolol

tartrate 25 mg twice a day, Lipitor 80 mg daily.



PHYSICAL EXAMINATION:

Blood pressure 140/70 with the heart rate in the 80s.

LUNGS:  Clear.

HEART:  Regular rate and rhythm, S1, S2.  No S3.  No rub.

ABDOMEN:  Soft, nontender.

EXTREMITIES:  No edema.

Right radial pulse is intact.



EKG revealed no acute changes.



IMPRESSION:

1. Status post stenting of the left anterior descending artery with non ST-segment

    elevation myocardial infarction.

2. Hypertension.

3. Hyperlipidemia.

4. Diabetes mellitus.



RECOMMENDATION:

Patient should be able to be discharged home today and follow up in 1 week.  She will

resume her metformin in 48 hours.





MMODL / IJN: [id number] / Job#: [id number]

## 2021-05-21 ENCOUNTER — HOSPITAL ENCOUNTER (OUTPATIENT)
Dept: HOSPITAL 47 - LABWHC1 | Age: 76
Discharge: HOME | End: 2021-05-21
Attending: NURSE PRACTITIONER
Payer: MEDICARE

## 2021-05-21 DIAGNOSIS — I10: Primary | ICD-10-CM

## 2021-05-21 DIAGNOSIS — E78.2: ICD-10-CM

## 2021-05-21 LAB
ALBUMIN SERPL-MCNC: 3.7 G/DL (ref 3.8–4.9)
ALBUMIN/GLOB SERPL: 1.37 G/DL (ref 1.6–3.17)
ALP SERPL-CCNC: 89 U/L (ref 41–126)
ALT SERPL-CCNC: 16 U/L (ref 8–44)
ANION GAP SERPL CALC-SCNC: 6 MMOL/L (ref 4–12)
AST SERPL-CCNC: 23 U/L (ref 13–35)
BUN SERPL-SCNC: 24 MG/DL (ref 9–27)
BUN/CREAT SERPL: 30 RATIO (ref 12–20)
CALCIUM SPEC-MCNC: 9.4 MG/DL (ref 8.7–10.3)
CHLORIDE SERPL-SCNC: 109 MMOL/L (ref 96–109)
CHOLEST SERPL-MCNC: 144 MG/DL (ref 0–200)
CO2 SERPL-SCNC: 26 MMOL/L (ref 21.6–31.8)
GLOBULIN SER CALC-MCNC: 2.7 G/DL (ref 1.6–3.3)
GLUCOSE SERPL-MCNC: 96 MG/DL (ref 70–110)
HDLC SERPL-MCNC: 57 MG/DL (ref 40–60)
LDLC SERPL CALC-MCNC: 74.2 MG/DL (ref 0–131)
POTASSIUM SERPL-SCNC: 4.6 MMOL/L (ref 3.5–5.5)
PROT SERPL-MCNC: 6.4 G/DL (ref 6.2–8.2)
SODIUM SERPL-SCNC: 141 MMOL/L (ref 135–145)
TRIGL SERPL-MCNC: 64 MG/DL (ref 0–149)
VLDLC SERPL CALC-MCNC: 12.8 MG/DL (ref 5–40)

## 2021-05-21 PROCEDURE — 36415 COLL VENOUS BLD VENIPUNCTURE: CPT

## 2021-05-21 PROCEDURE — 80061 LIPID PANEL: CPT

## 2021-05-21 PROCEDURE — 80053 COMPREHEN METABOLIC PANEL: CPT

## 2024-10-09 ENCOUNTER — HOSPITAL ENCOUNTER (OUTPATIENT)
Dept: HOSPITAL 47 - CATHCVL | Age: 79
Discharge: HOME | End: 2024-10-09
Attending: INTERNAL MEDICINE
Payer: MEDICARE

## 2024-10-09 VITALS — TEMPERATURE: 98.3 F | RESPIRATION RATE: 16 BRPM

## 2024-10-09 VITALS — HEART RATE: 74 BPM | SYSTOLIC BLOOD PRESSURE: 165 MMHG | DIASTOLIC BLOOD PRESSURE: 95 MMHG

## 2024-10-09 DIAGNOSIS — Z79.84: ICD-10-CM

## 2024-10-09 DIAGNOSIS — Z88.2: ICD-10-CM
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
## 2024-10-09 DIAGNOSIS — Z87.891: ICD-10-CM

## 2024-10-09 DIAGNOSIS — T82.855A: Primary | ICD-10-CM

## 2024-10-09 DIAGNOSIS — I10: ICD-10-CM

## 2024-10-09 DIAGNOSIS — I25.10: ICD-10-CM

## 2024-10-09 DIAGNOSIS — Z79.82: ICD-10-CM

## 2024-10-09 DIAGNOSIS — Z79.899: ICD-10-CM

## 2024-10-09 DIAGNOSIS — I34.0: ICD-10-CM

## 2024-10-09 DIAGNOSIS — E11.9: ICD-10-CM

## 2024-10-09 DIAGNOSIS — E78.5: ICD-10-CM

## 2024-10-09 LAB — GLUCOSE BLD-MCNC: 99 MG/DL (ref 70–110)

## 2024-10-09 PROCEDURE — 93458 L HRT ARTERY/VENTRICLE ANGIO: CPT

## 2024-10-09 RX ADMIN — LIDOCAINE HYDROCHLORIDE ONE ML: 10 INJECTION, SOLUTION INFILTRATION; PERINEURAL at 09:04

## 2024-10-09 RX ADMIN — POTASSIUM CHLORIDE ONE MLS: 14.9 INJECTION, SOLUTION INTRAVENOUS at 07:30

## 2024-10-09 RX ADMIN — FENTANYL CITRATE ONE MCG: 50 INJECTION, SOLUTION INTRAMUSCULAR; INTRAVENOUS at 09:00

## 2024-10-09 RX ADMIN — VERAPAMIL HYDROCHLORIDE ONE ML: 2.5 INJECTION, SOLUTION INTRAVENOUS at 09:07

## 2024-10-09 RX ADMIN — ASPIRIN 325 MG ORAL TABLET ONE MG: 325 PILL ORAL at 08:11

## 2024-10-09 RX ADMIN — CEFAZOLIN SCH MLS/HR: 330 INJECTION, POWDER, FOR SOLUTION INTRAMUSCULAR; INTRAVENOUS at 08:11

## 2024-10-09 RX ADMIN — CEFAZOLIN PRN MLS: 330 INJECTION, POWDER, FOR SOLUTION INTRAMUSCULAR; INTRAVENOUS at 09:22

## 2024-10-09 RX ADMIN — HEPARIN SODIUM ONE UNIT: 1000 INJECTION, SOLUTION INTRAVENOUS; SUBCUTANEOUS at 09:15

## 2024-10-09 RX ADMIN — IOPAMIDOL ONE ML: 755 INJECTION, SOLUTION INTRAVENOUS at 09:21

## 2024-10-09 RX ADMIN — METHYLENE BLUE PRN MLS: 10 INJECTION INTRAVENOUS at 09:22

## 2024-10-09 NOTE — P.CARDCATH
Date of Procedure: 10/09/24


Description of Procedure: 


Cardiac Catheterization:


The patient is a 79-year-old female with a known history of CAD who has been 

complaining of progressive dyspnea.  She had an abnormal MPI.  She has a prior 

history of CAD with stenting of the LAD in 2018.  Recommendations were made 

regarding cardiac catheterization, the risks and the complications were 

discussed with the patient who is in full understanding and agreement.





Procedure Description:


Patient was brought to cath lab in fasting semi-sedated state after receiving 

Fentanyl and Benadryl achieiving moderate conscious sedated state. Using 

Xylocaine Anesthesia and modified Seldinger technique, a 6-[place] sheath was 

introduced in the right radial artery . 





Subsequently, selective coronary angiography was performed using a 5-[place] 3.5 

bend Daisy catheter. Multiple views of the coronary artery including hemiaxial

views were obtained. The right Daisy catheter was used to cross the aortic 

valve and LVEDP was calculated. Following that, catheter and sheath were 

removed. Hemostasis was obtained with deployment of vascular band . There was no

immediate complication. Patient was returned to room in stable condition. Of 

note, the patient received a total of 5000 units of intravenous heparin as well 

as intra-arterial verapamil. 





Findings:





Left main: This is a large size vessel, bifurcating into LAD and left 

circumflex, left main has no obstructive disease


LAD: This is a large size vessel, giving rise to a large proximal diagonal bra

nch.  The proximal segment of the LAD has a stent with about 20% in-stent 

restenosis.  The left circumflex is a branching vessel and the inferior branch 

has a 30% plaque proximally, the rest of the vessel has no high-grade stenosis


Left circumflex: This is a nondominant vessel giving rise to 3 obtuse marginal 

branch, the first 1 is the largest in caliber.  The left circumflex and its 

branches have no obstructive disease


RCA: This is a large dominant vessel, bifurcating distally to PDA and PLV.  The 

proximal RCA has 20 to 30% plaque the mid segment is calcified and has a 50% 

eccentric lesion with no progression compared to 2028, the rest of the vessel 

has no high-grade stenosis





Left Ventriculogram: Not performed


Hemodynamics: There was no gradient across aortic valve, LVEDP was 20-24 mmHg


Conclusion: 


1.  Patent stent in the proximal LAD with mild in-stent restenosis


2.  Mild to moderate disease in the mid RCA with no progression


3.  Right dominance


4.  Elevated LVEDP





Recommendations: 


I would recommend to continue medical therapy, I see no progression of disease 

compared to 2018, we will continue aggressive coronary risks modifications. The 

findings and the recommendations were discussed with the patient and the family 

and they were in full understanding and agreement.





Duration of sedation is 18 minutes.